# Patient Record
(demographics unavailable — no encounter records)

---

## 2017-01-19 NOTE — ERRECORD
[facility]

                                EMERGENCY RECORD



HPI SORE THROAT (19:48 LLDO)

CHIEF COMPLAINT: Patient presents for evaluation of sore

      throat, Patient presents for evaluation of see triage

      note. HISTORIAN: History provided by patient, History

      provided by patient's spouse. LOCATION: Symptoms are

      generalized. QUALITY: Pain is dull in

      nature, described as aching, described as

      swelling sensation. SEVERITY: Maximum

      severity of symptoms moderate, Currently symptoms are

      mild. TIME COURSE: Gradual onset of

      symptoms, Symptoms are worsening, are

      constant. ASSOCIATED WITH: No associated fever, No

      associated chills, No associated cough, No associated drooling,

      Associated with dysphagia, No associated dysphonia, No

      associated headache, No associated inability to open mouth, No

      associated inability to take oral fluids, No associated nasal

      discharge, No associated recent tooth extraction, No associated

      shortness of breath, No associated trauma, Associated with upper

      respiratory infection, No associated vomiting. EXACERBATED

      BY: Patient's condition exacerbated by activity,

      Patient's condition exacerbated by food. RELIEVED BY:

      Patient's condition relieved by cold fluids.



ROS

CONSTITUTIONAL: Historian denies chills, reports

      fatigue, denies fever. pt is extremely anxious. has been

      spending a lot of time on the web, looking up sx.. is terrified she

      is about to suffocate at any moment. (19:51 LLDO)

EYES: Negative eye review of systems, Historian denies eye pain,

      denies eye redness, denies eye discharge. (19:59 LLDO)

ENT: Historian denies drooling, denies dysphonia, reports

      sore throat. (19:51 LLDO)

CARDIOVASCULAR: Historian denies dyspnea on exertion. (19:51

      LLDO)

RESPIRATORY: Historian denies cough, denies shortness of breath,

      denies sputum, denies stridor, denies wheezing. (19:51 LLDO)

MUSCULOSKELETAL: Negative musculoskeletal review of systems,

      Historian denies arthralgias, denies back pain, denies injury, denies

      myalgias, denies neck pain. (19:59 LLDO)

SKIN: Negative skin review of systems, Historian denies

      cellulitis, denies rash, denies skin changes, denies skin lesions.

      (19:59 LLDO)

NEUROLOGIC: Historian denies confusion, denies dizziness, denies

      dysphasia, denies focal weakness, denies gait changes, denies

      headache, denies irritability, denies lethargy, denies mental status

      changes. (19:51 LLDO)

HEMO/LYMPHATIC: Normal hematologic/lymphatic system review,

      Historian denies abnormal blood clotting, denies gum bleeding, denies

      petechiae. (19:59 LLDO)

ALLERGIC/IMMUNOLOGIC: Normal allergy/immunologic system review,



&a-1R&a+25V*p+0X*y6239S*c202B*c15G*c2P*p-0X&a-25V&a+1R          Name: Corina Chapman  : 1978 F38 MedRec: [id number]

                             AcctNum: [id number]

  Prepared: Thu 2017 20:18 by Interface                 Page 1 of 4

                                      pMD

                        [facility]

                                EMERGENCY RECORD



      Historian denies eczema, denies environmental allergies, denies food

      allergies. (19:59 LLDO)

PSYCHIATRIC: Historian reports anxiety, reports

      depression, reports emotional lability. (19:51 LLDO)

NOTES: All systems reviewed, negative except as described above.

      (19:51 LLDO)



PAST MEDICAL HISTORY

MEDICAL HISTORY:  Notes: HEP C WITH STAGE II CIRRHOSIS,

      Tetanus not up to date, Past medical history includes neurological

      disease, migraine headaches, OVARIAN CYST TO RIGHT OVARY, Notes:

      SEVERE SINUS "ISSUES". Hepatitis C. reviewed 17. (18:50

      MCRS)

FEMALE SURGICAL HISTORY:  Surgical history of tubal

      ligation. D<LT>LT>AMP>C., upper GI and lower GI endoscopy,

      choleysystectomy on 2016. reviewed 17. (18:50 MCRS)

PSYCHIATRIC HISTORY:  Notes: BI-POLAR, anxiety. reviewed

      17. (18:50 MCRS)

SOCIAL HISTORY:  Patient denies alcohol use, Patient is a

      former drug user, abused opiate, Patient currently uses tobacco,

      smokes cigarettes, daily, Patient has smoked for 25 years, Patient

      smokes 1 pack per day, Lives at home, with family, former vicodin

      user. 3 years ago. reviewed 17. (18:50 MCRS)

FAMILY HISTORY:  Family history is non-contributory to this

      case. (18:50 MCRS)

NOTES: Nursing records reviewed, Agree with nursing records,

      Medication list reviewed. (19:59 LLDO)



KNOWN ALLERGIES

Penicillins



CURRENT MEDICATIONS

QUEtiapine: 

      TABLET : Strength - 100 mg : ORAL

      Patient Dose: 1 tab(s) Oral once a day (at bedtime). (18:36

      MCRS)

lamoTRIgine: 

      TABLET : Strength - 150 mg : ORAL

      Patient Dose: 1 tab(s) Oral once a day. (18:37 MCRS)



VITAL SIGNS (18:29 MCRS)

VITAL SIGNS: BP: 177/91 (Sitting), Pulse: 74, Resp: 24, Temp:

      97.7 (Tympanic), Pain: 0, O2 sat: 100 on Room Air, Time: 2017

      18:29.



PHYSICAL EXAM

CONSTITUTIONAL: Vital Signs Reviewed, Patient afebrile, Pulse

      normal, Blood pressure, BP ELEVATED,

      Respiratory rate, increased, 24,

      Patient appears, uncomfortable, Patient



&a-1R&a+25V*p+0X*i0927J*c202B*c15G*c2P*p-0X&a-25V&a+1R          Name: Corina Chapman  : 1978 F38 MedRec: [id number]

                             AcctNum: [id number]

  Prepared: Thu 2017 20:18 by Interface                 Page 2 of 4

                                      pMD

                        [facility]

                                EMERGENCY RECORD



      appears, in mild pain distress, Patient alert and

      oriented to person, place and time, Nursing notes reviewed. (19:55

      LLDO)

HEAD: Head exam normal, Head exam included findings of head

      atraumatic, normocephalic. (19:59 LLDO)

EYES: Eye exam normal, Eye exam included findings of eyelids

      normal to inspection, Pupils equally round and reactive to light,

      Extraocular muscles intact. (19:59 LLDO)

ENT: Ear exam normal, Nose exam normal, Pharynx,

      injected bilaterally, with swelling bilaterally,

      symmetrical, Uvula exam normal, Tonsil exam normal, not enlarged, no

      exudates, Mouth exam normal, Sinus exam included findings of frontal

      sinuses normal, maxillary sinuses normal, upper airway wide open with

      no indication of any dangerous swelling or potential airway closure.

      (19:55 LLDO)

NECK: Neck exam included findings of normal range of motion,

      Trachea midline, Thyroid normal, no meningeal signs, Cervical

      adenopathy, diffuse, multiple nodes,

      tender, swollen. (19:55 LLDO)

RESPIRATORY CHEST: Respiratory exam included findings of no

      respiratory distress, Breath sounds clear, Chest exam included

      findings of chest movement symmetrical, Chest expansion equal. (19:55

      LLDO)

BACK: Back exam normal, Back exam included findings of normal

      inspection, range of motion normal. (19:59 LLDO)

UPPER EXTREMITY: Upper extremity exam normal, Upper extremity

      exam included findings of inspection normal, Range of motion normal.

      (19:59 LLDO)

LOWER EXTREMITY: Lower extremity exam normal, Lower extremity

      exam included findings of inspection normal, Range of motion normal.

      (19:59 LLDO)

NEURO: Neuro exam normal, Neuro exam findings include patient

      oriented to person, place and time, Speech normal, [place] coma scale

      15. (19:59 LLDO)

SKIN: Skin exam normal, Skin exam included findings of skin warm,

      dry, and normal in color, no rash. (19:59 LLDO)

PSYCHIATRIC: Psychiatric exam normal, Psychiatric exam included

      findings of patient oriented to person place and time, Normal affect.

      (19:59 LLDO)



MEDICATION ADMINISTRATION SUMMARY



      Drug Name: methylPREDNISolone acetate, Dose Ordered: 80 mg, Route:

      Intramuscular, Status: Given, Time: 19:59 2017,

      Drug Name: Xanax, Dose Ordered: 2 mg, Route: Oral, Status: Given,

      Time: 19:59 2017,

      Drug Name: predniSONE oral, Dose Ordered: 60 mg, Route: Oral, Status:

      Given, Time: 19:58 2017,

      Drug Name: Zithromax oral, Dose Ordered: 500 mg, Route: Oral, Status:

      Given, Time: 19:58 2017,



&a-1R&a+25V*p+0X*y6047M*c202B*c15G*c2P*p-0X&a-25V&a+1R          Name: Ari 
Corina P  : 1978 F38 MedRec: [id number]

                             AcctNum: [id number]

  Prepared: Thu 2017 20:18 by Interface                 Page 3 of 4

                                      pMD

                        [facility]

                                EMERGENCY RECORD



      Drug Name: Benadryl oral, Dose Ordered: 50 mg, Route: Oral, Status:

      Given, Time: 19:57 2017, Detailed record available in Medication

      Service section.



PROBLEM LIST

  No recorded problems



DIAGNOSIS (19:44 LLDO)

FINAL: PRIMARY: Acute pharyngitis, ADDITIONAL:

      ACUTE STRESS REACTION.



PRESCRIPTION (19:46 LLDO)

Ativan oral:  TABLET : 1 mg : ORAL : Quantity: *** 1 *** Unit:

      tab(s) Route: ORAL Schedule: every 8 hours PRN Dispense: *** 30 ***

      Unit: tab(s)

      May substitute. Refills: *** No Refills ***.

NOTES:  No Refills.

predniSONE oral:  TABLET : 20 mg : ORAL : Quantity: *** * ***

      Unit: Route: ORAL Schedule: See Notes Dispense: *** 2O ***

      May substitute. Refills: *** No Refills ***.

NOTES:  3 TABS PER DAY FOR 3 DAYS, THEN 2 TABS PER DAY FOR 3

      DAYS, THEN ONE TAB PER DAY UNTIL GONE

       No Refills.

Zithromax Z-Herrera:  CAPSULE (HARD, SOFT, ETC.) : 250 mg : ORAL :

      Quantity: *** * *** Unit: Route: ORAL Schedule: See Notes Dispense:

      *** 1PK ***

      May substitute. Refills: *** No Refills ***.

NOTES:  TAKE AS DIRECTED ON PACKAGE

       No Refills.



DISPOSITION

PATIENT:  Disposition Type: Discharge, Disposition: *Discharge

      Home. (19:44 LLDO)

   Disposition Transport: Car, Condition: Good. (20:11 MCRS)

   Patient left the department. (20:11 MCRS)

Richard:

  LLDO=MD Cande, Shivam  MCRS=LORETTA Velazquez, Dariusz





























&a-1R&a+25V*p+0X*b8760J*c202B*c15G*c2P*p-0X&a-25V&a+1R          Name: Corina Chapman  : 1978 F38 MedRec: [id number]

                             AcctNum: [id number]

  Prepared: Thu 2017 20:18 by Interface                 Page 4 of 4

                                      pMD

MTDD

## 2017-01-19 NOTE — PICIS
[facility]

                                EMERGENCY RECORD



TRIAGE ( 18:33 MCRS)

TRIAGE NOTES:  COMPLAINT OF FEELING LIKE THROAT SWOLLEN

      ...DIFFICULTY SWALLOWING X2 WEEKS - HAS BECOME PROGRESIVELY WORSE -

      DENIES PAIN. ( 18:33 MCRS)

PATIENT: NAME: Corina Chapman, AGE: 38, GENDER: female, :

      Sat Dec 09, 1978, TIME OF GREET:  18:23, PREFERRED

      LANGUAGE: English, ETHNICITY: Not  or , FALL RISK: NO,

      ECODE BILLING MAP: [facility], SSN: [id number], Zip Code:

      [address], KG WEIGHT: 74.84, PHONE: [phone], MEDICAL RECORD NUMBER:

      [id number], ACCOUNT NUMBER: [id number], PERSON ID: [id number], PCP:

      DO Malone Hillary. ( 18:33 MCRS)

COMPLAINT:  THROAT/BREATHING PAIN. ( 18:33 MCRS)

ADMISSION: URGENCY: 4 Non Urgent, ADMISSION SOURCE: Home,

      TRANSPORT: CAR, BED: ED -04. ( 18:33 MCRS)

ASSESSMENT: Assessment: complaint of throat swelling and

      difficulty swallowing x1week... stated she looked this up on the

      internet and is now very upset.. patient is tearful.. some redness to

      throat but does not appear swollen, Symptoms began x1 week. (18:50

      MCRS)

IMMUNIZATIONS: Flu vaccine not up to date, Tetanus

      not up to date, Pneumococcal vaccine not up to date.

      (18:50 MCRS)

SIRS SCORING: Heart Rate  (0), Temp range 96.8-101.1 (0),

      respiratory rate 12-24 (0), Mental Status altered: no (0). (18:50

      MCRS)

PROVIDERS: TRIAGE NURSE: Dariusz Velazquez RN. (

      18:33 MCRS)

VITAL SIGNS: /91, (Sitting), Pulse 74, Resp 24, Temp 97.7,

      (Tympanic), Pain 0, O2 Sat 100, on Room Air, Time 2017 18:29.

      (18:29 MCRS)

PREVIOUS VISIT ALLERGIES: Penicillins. ( 18:33

      MCRS)

  Penicillins. (18:50 MCRS)



KNOWN ALLERGIES

Penicillins



CURRENT MEDICATIONS

QUEtiapine: 

      TABLET : Strength - 100 mg : ORAL

      Patient Dose: 1 tab(s) Oral once a day (at bedtime). (18:36

      MCRS)

lamoTRIgine: 

      TABLET : Strength - 150 mg : ORAL

      Patient Dose: 1 tab(s) Oral once a day. (18:37 MCRS)



VITAL SIGNS (18:29 MCRS)

VITAL SIGNS: BP: 177/91 (Sitting), Pulse: 74, Resp: 24, Temp:

      97.7 (Tympanic), Pain: 0, O2 sat: 100 on Room Air, Time: 2017

      18:29.



&a-1R&a+25V*p+0X*s1940E*c202B*c15G*c2P*p-0X&a-25V&a+1R          Name: Corina Chapman YISEL  : 1978 F38 MedRec: [id number]

                             AcctNum: [id number]

  Prepared:  20:24 by Interface                 Page 1 of 7

                                      pMD

                        [facility]

                                EMERGENCY RECORD





NURSING ASSESSMENT: ENT (18:51 MCRS)

CONSTITUTIONAL: Patient arrives ambulatory, Gait steady, History

      obtained from patient, Patient appears comfortable, Patient

      cooperative, Patient alert, Oriented to person, place and time, Skin

      warm, Skin dry, Skin normal in color, Mucous membranes pink, Mucous

      membranes moist, Patient is well-groomed, Patient complains of

      swollen throat - fears throat flap will swell shut.

ENT: Ear assessment findings include ear normal to inspection,

      Uvula normal, Mucous membranes, mildly reddish,

      and moist, Able to swallow, Speech normal.

RESPIRATORY/CHEST: Breath sounds clear, Respiratory assessment

      findings include respiratory effort easy, Respirations regular,

      Conversing normally, Neck and chest exam findings include trachea

      midline, Chest expansion equal, Chest movement symmetrical.

NOTES: Emotional support needed and given, Notes: appears

      anxious.

SAFETY: Side rails up, Cart/Stretcher in lowest position, Family

      at bedside, Call light within reach, Hospital ID band on.



MEDICATION ADMINISTRATION SUMMARY



      Drug Name: methylPREDNISolone acetate, Dose Ordered: 80 mg, Route:

      Intramuscular, Status: Given, Time: 19:59 2017,

      Drug Name: Xanax, Dose Ordered: 2 mg, Route: Oral, Status: Given,

      Time: 19:59 2017,

      Drug Name: predniSONE oral, Dose Ordered: 60 mg, Route: Oral, Status:

      Given, Time: 19:58 2017,

      Drug Name: Zithromax oral, Dose Ordered: 500 mg, Route: Oral, Status:

      Given, Time: 19:58 2017,

      Drug Name: Benadryl oral, Dose Ordered: 50 mg, Route: Oral, Status:

      Given, Time: 19:57 2017, Detailed record available in Medication

      Service section.



MEDICATION SERVICE

Benadryl oral:  Order: Benadryl oral (diphenhydramine HCl) -

      Dose: 50 mg : Oral

      Schedule: Now

      Ordered by: Shivam Castellon MD

      Entered by: Shivam Castellon MD Thu 2017 19:43 ,

      Acknowledged by: Dariusz Velazquez RN Thu 2017 19:51

      Documented as given by: Dariusz Velazquez RN Thu 2017 19:57

      Patient, Medication, Dose, Route and Time verified prior to

      administration.

       Amount given: 50, Site: Medication administered P.O., Patient

      appears Awake and alert- acceptable, Correct patient, time, route,

      dose and medication confirmed prior to administration, Patient

      advised of actions and side-effects prior to administration,

      Allergies confirmed and medications reviewed prior to administration,

      Patient in position of comfort, Side rails up, Cart in lowest



&a-1R&a+25V*p+0X*b7039X*c202B*c15G*c2P*p-0X&a-25V&a+1R          Name: Corina Chapman YISEL  : 1978 F38 MedRec: [id number]

                             AcctNum: [id number]

  Prepared:  20:24 by Interface                 Page 2 of 7

                                      pMD

                        [facility]

                                EMERGENCY RECORD



      position, Family at bedside.

methylPREDNISolone acetate:  Order: methylPREDNISolone acetate

      (methylprednisolone acetate) - Dose: 80 mg : Intramuscular

      Schedule: Now

      Ordered by: Shivam Castellon MD

      Entered by: Shivam Castellon MD Thu 2017 19:36 ,

      Acknowledged by: Dariusz Velazquez RN Thu 2017 19:38

      Documented as given by: Dariusz Velazquez RN Thu 2017 19:59

      Patient, Medication, Dose, Route and Time verified prior to

      administration.

      IM medication, Amount given: 80MG, Medication administered to right

      buttock, Patient appears Awake and alert- acceptable, Correct

      patient, time, route, dose and medication confirmed prior to

      administration, Patient advised of actions and side-effects prior to

      administration, Allergies confirmed and medications reviewed prior to

      administration, Patient in position of comfort, Side rails up, Cart

      in lowest position, Family at bedside.

predniSONE oral:  Order: predniSONE oral (prednisone) -

      Dose: 60 mg : Oral

      Schedule: Now

      Ordered by: Shivam Castellon MD

      Entered by: Shivam Castellon MD Thu 2017 19:37 ,

      Acknowledged by: Dariusz Velazquez RN Thu 2017 19:38

      Documented as given by: Dariusz Velazquez RN Thu 2017 19:58

      Patient, Medication, Dose, Route and Time verified prior to

      administration.

       Amount given: 60MG, Site: Medication administered P.O., Patient

      appears Awake and alert- acceptable, Correct patient, time, route,

      dose and medication confirmed prior to administration, Patient

      advised of actions and side-effects prior to administration,

      Allergies confirmed and medications reviewed prior to administration,

      Patient in position of comfort, Side rails up, Cart in lowest

      position, Family at bedside.

Xanax:  Order: Xanax (alprazolam) - Dose: 2 mg : Oral

      Schedule: Now

      Ordered by: Shivam Castellon MD

      Entered by: Shivam Castellon MD Thu 2017 19:36 ,

      Acknowledged by: Dariusz Velazquez RN Thu 2017 19:38

      Documented as given by: Dariusz Velazquez RN Thu 2017 19:59

      Patient, Medication, Dose, Route and Time verified prior to

      administration.

       Amount given: 2MG, Site: Medication administered P.O., Patient

      appears Awake and alert- acceptable, Correct patient, time, route,

      dose and medication confirmed prior to administration, Patient

      advised of actions and side-effects prior to administration,

      Allergies confirmed and medications reviewed prior to administration,

      Patient in position of comfort, Side rails up, Cart in lowest

      position, Family at bedside.

Zithromax oral:  Order: Zithromax oral (azithromycin) -

      Dose: 500 mg : Oral



&a-1R&a+25V*p+0X*l2252I*c202B*c15G*c2P*p-0X&a-25V&a+1R          Name: Corina Chapman  : 1978 F38 MedRec: [id number]

                             AcctNum: [id number]

  Prepared: Thu 2017 20:24 by Interface                 Page 3 of 7

                                      pMD

                        [facility]

                                EMERGENCY RECORD



      Schedule: Now

      Ordered by: Shivam Castellon MD

      Entered by: Shivam Castellon MD Thu 2017 19:41 ,

      Acknowledged by: Dariusz Velazquez RN Thu 2017 19:51

      Documented as given by: Dariusz Velazquez RN Thu 2017 19:58

      Patient, Medication, Dose, Route and Time verified prior to

      administration.

       Amount given: 500MG, Site: Medication administered P.O., Patient

      appears Awake and alert- acceptable, Correct patient, time, route,

      dose and medication confirmed prior to administration, Patient

      advised of actions and side-effects prior to administration,

      Allergies confirmed and medications reviewed prior to administration,

      Patient in position of comfort, Side rails up, Cart in lowest

      position, Family at bedside.



HPI SORE THROAT (19:48 LLDO)

CHIEF COMPLAINT: Patient presents for evaluation of sore

      throat, Patient presents for evaluation of see triage

      note. HISTORIAN: History provided by patient, History

      provided by patient's spouse. LOCATION: Symptoms are

      generalized. QUALITY: Pain is dull in

      nature, described as aching, described as

      swelling sensation. SEVERITY: Maximum

      severity of symptoms moderate, Currently symptoms are

      mild. TIME COURSE: Gradual onset of

      symptoms, Symptoms are worsening, are

      constant. ASSOCIATED WITH: No associated fever, No

      associated chills, No associated cough, No associated drooling,

      Associated with dysphagia, No associated dysphonia, No

      associated headache, No associated inability to open mouth, No

      associated inability to take oral fluids, No associated nasal

      discharge, No associated recent tooth extraction, No associated

      shortness of breath, No associated trauma, Associated with upper

      respiratory infection, No associated vomiting. EXACERBATED

      BY: Patient's condition exacerbated by activity,

      Patient's condition exacerbated by food. RELIEVED BY:

      Patient's condition relieved by cold fluids.



ROS

CONSTITUTIONAL: Historian denies chills, reports

      fatigue, denies fever. pt is extremely anxious. has been

      spending a lot of time on the web, looking up sx.. is terrified she

      is about to suffocate at any moment. (19:51 LLDO)

EYES: Negative eye review of systems, Historian denies eye pain,

      denies eye redness, denies eye discharge. (19:59 LLDO)

ENT: Historian denies drooling, denies dysphonia, reports

      sore throat. (19:51 LLDO)

CARDIOVASCULAR: Historian denies dyspnea on exertion. (19:51

      LLDO)

RESPIRATORY: Historian denies cough, denies shortness of breath,



&a-1R&a+25V*p+0X*x3616W*c202B*c15G*c2P*p-0X&a-25V&a+1R          Name: Corina Chapman  : 1978 F38 MedRec: [id number]

                             AcctNum: [id number]

  Prepared: Thu 2017 20:24 by Interface                 Page 4 of 7

                                      pMD

                        [facility]

                                EMERGENCY RECORD



      denies sputum, denies stridor, denies wheezing. (19:51 LLDO)

MUSCULOSKELETAL: Negative musculoskeletal review of systems,

      Historian denies arthralgias, denies back pain, denies injury, denies

      myalgias, denies neck pain. (19:59 LLDO)

SKIN: Negative skin review of systems, Historian denies

      cellulitis, denies rash, denies skin changes, denies skin lesions.

      (19:59 LLDO)

NEUROLOGIC: Historian denies confusion, denies dizziness, denies

      dysphasia, denies focal weakness, denies gait changes, denies

      headache, denies irritability, denies lethargy, denies mental status

      changes. (19:51 LLDO)

HEMO/LYMPHATIC: Normal hematologic/lymphatic system review,

      Historian denies abnormal blood clotting, denies gum bleeding, denies

      petechiae. (19:59 LLDO)

ALLERGIC/IMMUNOLOGIC: Normal allergy/immunologic system review,

      Historian denies eczema, denies environmental allergies, denies food

      allergies. (19:59 LLDO)

PSYCHIATRIC: Historian reports anxiety, reports

      depression, reports emotional lability. (19:51 LLDO)

NOTES: All systems reviewed, negative except as described above.

      (19:51 LLDO)



PAST MEDICAL HISTORY

MEDICAL HISTORY:  Notes: HEP C WITH STAGE II CIRRHOSIS,

      Tetanus not up to date, Past medical history includes neurological

      disease, migraine headaches, OVARIAN CYST TO RIGHT OVARY, Notes:

      SEVERE SINUS "ISSUES". Hepatitis C. reviewed 17. (18:50

      MCRS)

FEMALE SURGICAL HISTORY:  Surgical history of tubal

      ligation. D<LT>LT>AMP>C., upper GI and lower GI endoscopy,

      choleysystectomy on 2016. reviewed 17. (18:50 MCRS)

PSYCHIATRIC HISTORY:  Notes: BI-POLAR, anxiety. reviewed

      17. (18:50 MCRS)

SOCIAL HISTORY:  Patient denies alcohol use, Patient is a

      former drug user, abused opiate, Patient currently uses tobacco,

      smokes cigarettes, daily, Patient has smoked for 25 years, Patient

      smokes 1 pack per day, Lives at home, with family, former vicodin

      user. 3 years ago. reviewed 17. (18:50 MCRS)

FAMILY HISTORY:  Family history is non-contributory to this

      case. (18:50 MCRS)

NOTES: Nursing records reviewed, Agree with nursing records,

      Medication list reviewed. (19:59 LLDO)



PHYSICAL EXAM

CONSTITUTIONAL: Vital Signs Reviewed, Patient afebrile, Pulse

      normal, Blood pressure, BP ELEVATED,

      Respiratory rate, increased, 24,

      Patient appears, uncomfortable, Patient

      appears, in mild pain distress, Patient alert and

      oriented to person, place and time, Nursing notes reviewed. (19:55



&a-1R&a+25V*p+0X*x3813N*c202B*c15G*c2P*p-0X&a-25V&a+1R          Name: Corian Chapman  : 1978 F38 MedRec: [id number]

                             AcctNum: [id number]

  Prepared: Thu 2017 20:24 by Interface                 Page 5 of 7

                                      pMD

                        [facility]

                                EMERGENCY RECORD



      LLDO)

HEAD: Head exam normal, Head exam included findings of head

      atraumatic, normocephalic. (19:59 LLDO)

EYES: Eye exam normal, Eye exam included findings of eyelids

      normal to inspection, Pupils equally round and reactive to light,

      Extraocular muscles intact. (19:59 LLDO)

ENT: Ear exam normal, Nose exam normal, Pharynx,

      injected bilaterally, with swelling bilaterally,

      symmetrical, Uvula exam normal, Tonsil exam normal, not enlarged, no

      exudates, Mouth exam normal, Sinus exam included findings of frontal

      sinuses normal, maxillary sinuses normal, upper airway wide open with

      no indication of any dangerous swelling or potential airway closure.

      (19:55 LLDO)

NECK: Neck exam included findings of normal range of motion,

      Trachea midline, Thyroid normal, no meningeal signs, Cervical

      adenopathy, diffuse, multiple nodes,

      tender, swollen. (19:55 LLDO)

RESPIRATORY CHEST: Respiratory exam included findings of no

      respiratory distress, Breath sounds clear, Chest exam included

      findings of chest movement symmetrical, Chest expansion equal. (19:55

      LLDO)

BACK: Back exam normal, Back exam included findings of normal

      inspection, range of motion normal. (19:59 LLDO)

UPPER EXTREMITY: Upper extremity exam normal, Upper extremity

      exam included findings of inspection normal, Range of motion normal.

      (19:59 LLDO)

LOWER EXTREMITY: Lower extremity exam normal, Lower extremity

      exam included findings of inspection normal, Range of motion normal.

      (19:59 LLDO)

NEURO: Neuro exam normal, Neuro exam findings include patient

      oriented to person, place and time, Speech normal, Duglas coma scale

      15. (19:59 LLDO)

SKIN: Skin exam normal, Skin exam included findings of skin warm,

      dry, and normal in color, no rash. (19:59 LLDO)

PSYCHIATRIC: Psychiatric exam normal, Psychiatric exam included

      findings of patient oriented to person place and time, Normal affect.

      (19:59 LLDO)



EVENTS

TRANSFER:  Triage to Emergency Main ED -04. (Thu 2017

      18:33 MCRS)

   Removed from Emergency Main ED -04. (20:11 MCRS)



PROBLEM LIST

  No recorded problems



DIAGNOSIS (19:44 LLDO)

FINAL: PRIMARY: Acute pharyngitis, ADDITIONAL:

      ACUTE STRESS REACTION.





&a-1R&a+25V*p+0X*x9463V*c202B*c15G*c2P*p-0X&a-25V&a+1R          Name: AriCorina YISEL  : 1978 F38 MedRec: [id number]

                             AcctNum: [id number]

  Prepared: Thu 2017 20:24 by Interface                 Page 6 of 7

                                      pMD

                        [facility]

                                EMERGENCY RECORD



DISPOSITION

PATIENT:  Disposition Type: Discharge, Disposition: *Discharge

      Home. (19:44 LLDO)

   Disposition Transport: Car, Condition: Good. (20:11 MCRS)

   Patient left the department. (20:11 MCRS)



INSTRUCTION (19:47 LLDO)

DISCHARGE:  PHARYNGITIS, STREP (PRESUMED).

FO[facility]UP:  [facility] [address] [place] [address], [phone], Follow up with Primary

      Care Physician as needed.

SPECIAL:  Follow-up with your PCP.



PRESCRIPTION (19:46 LLDO)

Ativan oral:  TABLET : 1 mg : ORAL : Quantity: *** 1 *** Unit:

      tab(s) Route: ORAL Schedule: every 8 hours PRN Dispense: *** 30 ***

      Unit: tab(s)

      May substitute. Refills: *** No Refills ***.

NOTES:  No Refills.

predniSONE oral:  TABLET : 20 mg : ORAL : Quantity: *** * ***

      Unit: Route: ORAL Schedule: See Notes Dispense: *** 2O ***

      May substitute. Refills: *** No Refills ***.

NOTES:  3 TABS PER DAY FOR 3 DAYS, THEN 2 TABS PER DAY FOR 3

      DAYS, THEN ONE TAB PER DAY UNTIL GONE

       No Refills.

Zithromax Z-Herrera:  CAPSULE (HARD, SOFT, ETC.) : 250 mg : ORAL :

      Quantity: *** * *** Unit: Route: ORAL Schedule: See Notes Dispense:

      *** 1PK ***

      May substitute. Refills: *** No Refills ***.

NOTES:  TAKE AS DIRECTED ON PACKAGE

       No Refills.



ADMIN (20:03 LLDO)

DIGITAL SIGNATURE:  MD Castellon Lloyd.

Richard:

  KEHINDE=MD Castellon Lloyd  MCRS=LORETTA Velazquez, Daruisz































&a-1R&a+25V*p+0X*t0450G*c202B*c15G*c2P*p-0X&a-25V&a+1R          Name: Corina Chapman  : 1978 F38 MedRec: [id number]

                             AcctNum: [id number]

  Prepared:  20:24 by Interface                 Page 7 of 7

                                      pMD





                     [facility]

                        MEDICATION RECONCILIATION



    You were seen in the Emergency Department on: 

    

    KNOWN ALLERGIES

         Penicillins

    

    MEDICATIONS GIVEN WHILE IN THE EMERGENCY DEPARTMENT

    Xanax (alprazolam) - Dose: 2 milligram(s) : Oral

    methylPREDNISolone acetate (methylprednisolone acetate) - Dose: 80

    milligram(s) : Intramuscular

    predniSONE oral (prednisone) - Dose: 60 milligram(s) : Oral

    Zithromax oral (azithromycin) - Dose: 500 milligram(s) : Oral

    Benadryl oral (diphenhydramine HCl) - Dose: 50 milligram(s) : Oral

    

    HOME MEDICATIONS 

    

      CONTINUE AS PRESCRIBED

      lamoTRIgine : TABLET : Strength - 150 mg : ORAL

        Continue as prescribed

        Patient had been takin tab(s) Oral once a day.

    

      QUEtiapine : TABLET : Strength - 100 mg : ORAL

        Continue as prescribed

        Patient had been takin tab(s) Oral once a day (at bedtime).

    

    Notes from the emergency department

      Reviewed with family

      Reviewed with patient

    

    PRESCRIPTIONS (3)

    

    Printed (3)

    

      Ativan oral : TABLET : 1 mg : ORAL

          Quantity: 1, Unit: tab(s), Route: ORAL, Schedule: every 8

          hours PRN, Dispense: 30 Unit: tab(s)

    

      predniSONE oral : TABLET : 20 mg : ORAL

          Quantity: *, Unit: *, Route: ORAL, Schedule: See Notes,

          Dispense: 2O

    















&a-1R&a+25V*p+0X*r5483B*c202B*c15G*c2P*p-0X&a-25V&a+1R       Name: Corina Chapman YISEL  : 1978 F38 MedRec: [id number]

                          AcctNum: [id number]

               Prepared:  20:24 by Interface

                                   pMD

EM

## 2019-10-20 NOTE — RAD
EXAM: 3 views of the left shoulder



HISTORY: Shoulder pain



COMPARISON: None



FINDINGS: There is no evidence of acute fracture or dislocation. No degenerative changes are present.
 No soft tissue swelling is seen. The visualized thorax is unremarkable.



IMPRESSION:

No evidence of acute osseous abnormality.



Reported By: Shiv Morrell 

Electronically Signed:  10/20/2019 4:12 PM

## 2019-10-20 NOTE — RAD
EXAM:

Chest 2 views:



HISTORY:

Cough



COMPARISON:

None.



FINDINGS:

There is a normal-sized cardiomediastinal silhouette. There is no evidence of consolidation, mass, or
 pleural effusion.   The bones are unremarkable. 





IMPRESSION:

No evidence of acute cardiopulmonary disease



Reported By: Shiv Morrell 

Electronically Signed:  10/20/2019 4:06 PM

## 2019-12-04 NOTE — CT
CT ABDOMEN AND PELVIS WITHOUT IV CONTRAST:

 

Date:  12/04/19 

 

INDICATION:

Back pain. Stone protocol was followed. 

 

Comparison made to CT abdomen and pelvis with contrast dated 06/15/19. 

 

FINDINGS:

Lung bases clear. 

 

Liver, spleen, and pancreas are unremarkable. Mild splenomegaly is again seen and is unchanged from p
rior exam. Stomach and duodenum unremarkable. 

 

Adrenal glands normal. 

 

Kidneys unremarkable. There is no evidence of hydronephrosis or urinary calculus. 

 

Urinary bladder is contracted and not adequately evaluated. 

 

Small bowel loops are normal caliber. Appendix appears normal. Stool and gas throughout the colon. Ao
rta normal. 

 

Images through pelvis show unremarkable uterus and adnexa. No free fluid. 

 

IMPRESSION: 

No acute abnormality identified. 

 

 

POS: [facility]

## 2020-03-17 NOTE — RAD
Exam: Chest one view



HISTORY:Lightheadedness. Pain. Sepsis.



Comparison: 11/16/2015



FINDINGS:

Cardiac silhouette: Normal

Aorta: Unremarkable

Pulmonary vessels: Normal

Costophrenic angles: Clear



LUNGS: No masses or consolidation.



Pneumothorax: None



Osseous abnormalities: None



IMPRESSION: No acute cardiopulmonary process.



Reported By: Edna Thornton 

Electronically Signed:  3/17/2020 7:20 AM

## 2020-07-16 NOTE — CT
CT HEAD WITHOUT CONTRAST:

 

7/16/20

 

HISTORY:

Headache. 

 

Ventricles have normal size and position. Motion artifact degrades the exam. There is no evidence of 
intracranial hemorrhage, mass, or infarct. Sinuses and mastoids  are clear. 

 

IMPRESSION: 

No acute process identified.

 

POS: [facility]

## 2020-08-20 NOTE — RAD
EXAM: Single view of the chest



HISTORY:   Sepsis



COMPARISON: 3/17/2020



FINDINGS: Single view of the chest shows a normal sized cardiomediastinal silhouette. There is no cecilia
dence of consolidation, mass, or pleural effusion. No acute osseous abnormality.



IMPRESSION: No evidence of acute cardiopulmonary disease



Reported By: Shiv Morrell 

Electronically Signed:  8/20/2020 7:50 AM

## 2020-08-21 NOTE — HP
CHIEF COMPLAINT:  Dizzy.



HISTORY OF PRESENT ILLNESS:  The patient is a 41-year-old white female, who has

hepatitis C, for which she is under treatment.  She has a history of bipolar

disorder, anxiety disorder, and chronic low back pain from spinal stenosis.  She

also has a history of migraine headaches.  The patient has been under treatment 
for

hepatitis C, now for 28 days, receiving oral medication called Vosevi.  This has

been monitored and managed by hepatologist in [place].  She said since she has 
been

on the medication, she has been having diarrhea and more frequent headaches than

usual.  She has a history of migraine headaches, but lately they seem to be a 
little

worse. The diarrhea she describes as having 4 or 5 watery bowel movements a day.

This has been pretty stable and has not gotten any worse.  The headaches and the

diarrhea are very common side effects of the Vosevi.  The patient said that last

night she had been having a headache, was feeling better and went to shower and 
then

went to bed.  She had to get up to go urinate and felt very lightheaded and 
dizzy.

She was afraid that her blood pressure might be low since she had the same 
symptoms

when she was hospitalized in March 2020 for sepsis from a UTI presenting with

hypotension.  The patient came to the emergency room early on the morning of

08/20/2020 and her initial blood pressure was 79/51 with a pulse of 61.  Her

temperature was 98.3 and her O2 saturation was 98.  She did not have any 
complaints

other than the dizziness. While in the emergency room, the patient received 3 L 
of

IV saline with her blood pressure quickly coming up to 100/69 with a pulse of 
52.

She said she felt a lot better and the dizziness had gone away. In the emergency

room, her blood work showed an H and H of 12.8 and 41.2 with a white cell count 
of

7300 with 73% segs, 20% lymphocytes, 5% monocytes, and platelet count of 296,
000.

Her sodium was 139, potassium was 3.0, BUN was 15, creatinine 1.12.  Her GFR 
was 54,

glucose 96, lactic acid 1.7.  Her liver panel was all normal.  Her creatine 
kinase

was 84.  Troponin-I was less than 0.01.  Her serum pregnancy test was negative.
  Her

urinalysis shows specific gravity of 1.025, urine ketones were trace, urine 
blood

was trace, urine nitrite negative, WBC 0-3, RBC 0-3, squamous epithelial cells 0
-3.

In the emergency room, the patient received 3 L of fluid and also received 
potassium

chloride 40 mEq p.o. and the patient was then admitted for the hypotension, 
that had

improved with fluids. 



The patient was seen soon after her admission to her hospital room and she was 
able

to review with me the history of what brought her to the emergency room.  She 
said

she had a headache last evening, this got better and then she showered, went to 
bed,

and then got up to urinate and when she did, felt very, very dizzy.  She said 
lately

she has been having a lot more headaches and she has a history of migraine

headaches, but lately they have seemed more often, I suspect these headaches 
have

been a side effect of her hepatitis C treatment.  The patient said that right 
now

after she has had the IV fluids, she feels good.  The dizziness has all gone 
and she

is feeling fine.  The patient said that she has been having diarrhea, having 4 
to 5

watery bowel movements a day since she has been on the Vosevi.  She has had no

vomiting.  She does not think her weight has appreciably changed. 



PAST MEDICAL HISTORY:  The patient has anxiety disorder, bipolar disorder, 
migraine

headaches, hepatitis C, for which she has completed 1st 28 days of oral 
medication

Vosevi.  The patient received hepatitis B immunization prior to starting this

medication.  The patient said that she was told by the hepatologist that she 
does

have an enlarged spleen.  The patient has been treated in the past for 
hypertension

and she uses furosemide periodically for fluid retention.  The patient has a 
history

of polysubstance abuse with cocaine, methamphetamine, and marijuana, but has 
been

off this for sometime.  She also has a history of depression and 
gastroesophageal

reflux disease.  The patient has had a cholecystectomy in 2016, bilateral tubal

ligation in 2003, D and C due to spontaneous miscarriage, history of tobacco 
abuse.

The patient has had liver biopsy, colonoscopy, and EGD. Last menstrual period, 
the

patient said she is presently on her menses. 



PRESENT MEDICINES:  

1. Acetaminophen 325 mg 2 every 4 hours as needed.

2. Zofran orally disintegrating tablets 4 mg every 6 hours p.r.n.

3. Pepcid 20 mg daily.

4. Vosevi 1 daily.

5. Lisinopril 10 mg daily.

6. Fluoxetine 40 mg daily.

7. Alprazolam 2 mg at bedtime.

8. Seroquel 100 mg in the morning and Seroquel 200 mg at bedtime.

9. Lamictal 25 mg daily.



ALLERGIES:  PENICILLIN.



REVIEW OF SYSTEMS:  CONSTITUTIONAL:  The patient says she has not had any fever.

She has had some aches and pains in her arms, but had been told this was side 
effect

of her Vosevi.  The patient has had no appreciable change in her weight. 

HEAD AND NECK:  The patient said she has been having a lot more headaches than

usual.  She has a history of migraine headaches.  She has not had any trouble 
with

her ears or nasal congestion or drainage. 

PULMONARY: No complaints. 

CARDIOVASCULAR: No complaints. 

GI: The patient has had no nausea or vomiting.  She said she thinks she has been

eating good.  She has been having diarrhea 4 or 5 watery bowel movements a day 
since

she has been on the Vosevi, well recognized side effect. 

: Presently on her menses.  The patient has had a BTL. 

MUSCULOSKELETAL: The patient says she has had some aches and pains in her left

shoulder.  The patient has chronic low back pain from spinal stenosis.  Lately, 
she

said she has had more burning sensation there.  She used to be on gabapentin, 
but

this had been stopped due to fluid retention. 

NEUROPSYCHIATRIC: The patient said her depression and anxiety seem to be well

controlled. 

DERMATOLOGIC: No complaint.



HABITS:  Alcohol, none. Tobacco, less than a pack a day.  Illegal drugs, the 
patient

has a history of illegal drug use including cocaine, methamphetamines, and

marijuana, but has been off these for a long time and has had no recent use of

these. 



SOCIAL HISTORY:  The patient lives with her mom.



PHYSICAL EXAMINATION:

GENERAL:  The patient is a 41-year-old white female, who is sitting up in bed. 
She

is alert, talkative, appears very comfortable, and in no distress. 

VITAL SIGNS:  Temperature of 97.6, pulse 56, respirations 16, O2 saturation 99/
66.

Height 5 feet 8 inches, weight is 221. 

HEENT:  Head; normocephalic and atraumatic.  Eyes; pupils are equal, round, and

reactive.  Ears; TMs are clear. Nose, normal. Mouth and throat, normal. 

NECK: Carotids are equal and strong.  No bruits.  Thyroid not enlarged.  There 
is no

adenopathy.  The patient has an IV access in the left external jugular. 

LUNGS:  Clear with good breath sounds. 

HEART:  Regular rate. No murmurs. 

ABDOMEN: There is no distention.  Bowel sounds are present.  Abdomen is soft 
with no

organomegaly.  No areas of tenderness. 

BACK: There is no area of tenderness along the back.  There is no rash along the

back. 

EXTREMITIES:  Lower extremities; there is no edema. 

NEUROLOGIC: The patient is alert and oriented x3.  Her cranial nerves 2 through 
12

are grossly intact.  The patient had excellent and equal strength throughout. 

SKIN: No rash.



IMPRESSION:  

1. Hypotension.

    a. Presenting with complaints of dizziness.

    b. Initial blood pressure 79/51, pulse 61, improved to 100/69 with pulse of 
52 after

         3 L of saline and resolution of the dizziness. 

    c. Etiology, I suspect this is secondary to dehydration from the chronic 
diarrhea. 

    d. Improved with IV hydration.

2. Hepatitis C.

    a. Has completed 28 days of oral Vosevi of a 12-week course.

    b. Having side effects of increased headaches and diarrhea.

3. Chronic diarrhea since she has been on Vosevi.

    a. Complicated by dehydration with hypotension and hypokalemia.

4. Headaches.

    a. Recent increased frequency probably as a side effect of the Vosevi.

    b. History of migraine headaches.

5. Bipolar disorder, controlled.

6. Anxiety disorder, controlled.

7. Chronic low back pain secondary to spinal stenosis at the L4-L5 level.

8. Cigarette abuse.

9. History of hypertension.



PLAN:  The patient is doing much better after the hydration.  The patient does 
not

have any obvious source of infection nor does her lab studies indicate any

infection, blood and urine cultures though have been obtained.  I suspect that 
the

hypotension is a result of dehydration from the chronic diarrhea from her Vosevi
,

that has also created the hypokalemia.  We will continue the IV hydration.  We 
will

continue potassium supplementation.  We will await results of the blood and 
urine

culture.  At this time, we will hold off on any IV antibiotics.  The patient 
also

has been on antihypertensives, that has contributed to the hypotension along 
with

dehydration, this will be held.  She also occasionally will take furosemide 20 
mg if

needed for edema, this will also be held since it also may have contributed to 
the

fluid loss. 



CODE STATUS:  Full code.







Job ID:  [id number]



[facility]

## 2020-08-23 NOTE — DIS
DATE OF ADMISSION:  08/20/2020



DATE OF DISCHARGE:  08/21/2020



FINAL DIAGNOSES:  

1. Dehydration.

    a. Presenting with dizziness and hypotension.

    b. Etiology secondary to the chronic diarrhea from the Vosevi in 
combination with

         antihypertensive, diuretic and recent heat exposure. 

    c. Resolved with rehydration IV.

    d. Complicated by mild hypokalemia and mild renal insufficiency that has 
resolved. 

2. Hepatitis C.

    a. Has completed 28 days of a 12 week course of Vosevi.

    b. Side effects of the Vosevi have included diarrhea and headaches.

3. Headaches.

    a. Recent increased frequency due to the side effects of the Vosevi.

    b. History of migraine headaches.

4. Bipolar disorder, controlled.

5. Anxiety disorder, controlled.

6. Chronic low back pain secondary to spinal stenosis at L4-L5 level.

7. Cigarette abuse.

8. History of hypertension.



SUMMARY:  The patient is a 41-year-old white female, who has bipolar disorder 
that

is controlled, anxiety disorder controlled, chronic low back pain that is 
managed

and migraine headaches.  She has a history of hepatitis C for which she has just

completed 28 days of 12 week course of Vosevi.  She also has had a history of

hypertension for which she has been on lisinopril and also furosemide that she 
uses

on occasion for swelling.  The patient presented to the emergency room on the

morning of the day of admission, because of severe dizziness.  Lately, she said 
she

has been working very hard, has been exposed to a lot of heat and had a lot of

swelling plus she has had four five watery bowel movements a day for some time 
since

she has been on the Vosevi.  Her initial blood pressure was 79/51 and pulse is 
61.

She has had no signs of any bleeding.  Her urinalysis showed no evidence of

infection, but her specific gravity was 1.025, her lactic acid was 1.7, glucose 
96,

BUN was 15, creatinine 1.12 and potassium was 3.0.  H and H were 12.8 and 41.2.
  Her

electrocardiogram showed a normal sinus rhythm with a rate of 63 and normal.  
Her

chest x-ray was clear.  The patient did not appear to have any obvious 
infection.

She received 3 units of IV saline and was given potassium chloride 40 mEq p.o. 
With

this, she felt much better and blood pressure risen to 100/69.  The patient was

admitted for continued IV hydration.  She had blood and urine cultures drawn 
and was

continued on oral potassium supplementation.  Her lisinopril was stopped and

furosemide was not ordered.  This was used usually just on an occasion at home.
  She

did very well.  She was continued on IV saline at 125 mL/hour.  She began 
urinating

much more frequently.  She had no more dizziness, was able to get up ad brisa 
without

any difficulty.  Her blood pressure continued to do very well.  She had no more

hypotensive episodes.  Her blood pressures stayed around 100, on the evening of

08/20 it was 126/84, early morning was 119/80 and prior to her discharge was 149
/91.

 Her pulse was 58.  Her weight on admission was 221 and on discharge 222.  Her

followup lab work showed her potassium was 3.4 on the morning of 08/21.  Her 
BUN had

dropped from an admission of 15 to 9.  Her creatinine dropped from 1.12 to 0.67 
and

her GFR increased from admission of 54 to a discharge of greater than 90.  The

patient had no more lightheadedness or dizziness.  Her blood cultures had no 
growth

and her urine culture had no growth.  Serum pregnancy test was negative.  Her

condition improved.  Her dizziness had resolved and the hypotension resolved.  
The

patient was felt to have been dehydrated as a result of the chronic diarrhea, 
the

antihypertensive, intermittent use of furosemide and the recent heat exposure 
and

increased sweating.  Her lisinopril will be held and I have asked her not to 
use the

furosemide and encouraged her to continue to stay well hydrated, because she 
will

need to stay on her Vosevi for her hepatitis C.  Also reviewed with her the heat

precautions. 



DISPOSITION:  



DIET:  Regular diet, would encourage liquids.



ACTIVITIES:  Ad brisa.  Proper heat precautions.



MEDICATIONS:  

1. Acetaminophen 325 mg two every 4 hours as needed.  

2. Alprazolam 2 mg at bedtime. 

3. Seroquel 100 mg daily in the morning.

4. Seroquel 200 mg at bedtime.  

5. Lamictal 25 mg daily.

6. Vosevi 1 daily. 

7. Fluoxetine 40 mg daily. 

8. Pepcid 20 mg daily.

9. Potassium chloride extended release 10 mEq daily.



FOLLOWUP:  The patient will be seen in followup in my office in 2 weeks with a 
CBC

and basic metabolic panel. 







Job ID:  [id number]



[facility]